# Patient Record
Sex: MALE | Employment: FULL TIME | URBAN - METROPOLITAN AREA
[De-identification: names, ages, dates, MRNs, and addresses within clinical notes are randomized per-mention and may not be internally consistent; named-entity substitution may affect disease eponyms.]

---

## 2024-09-05 ENCOUNTER — OFFICE VISIT (OUTPATIENT)
Age: 44
End: 2024-09-05

## 2024-09-05 VITALS
OXYGEN SATURATION: 96 % | BODY MASS INDEX: 26.83 KG/M2 | WEIGHT: 177 LBS | RESPIRATION RATE: 18 BRPM | DIASTOLIC BLOOD PRESSURE: 79 MMHG | HEART RATE: 65 BPM | HEIGHT: 68 IN | SYSTOLIC BLOOD PRESSURE: 122 MMHG | TEMPERATURE: 98.7 F

## 2024-09-05 DIAGNOSIS — J40 BRONCHITIS: Primary | ICD-10-CM

## 2024-09-05 RX ORDER — DEXTROMETHORPHAN HYDROBROMIDE AND PROMETHAZINE HYDROCHLORIDE 15; 6.25 MG/5ML; MG/5ML
5 SYRUP ORAL 4 TIMES DAILY PRN
Qty: 120 ML | Refills: 0 | Status: SHIPPED | OUTPATIENT
Start: 2024-09-05

## 2024-09-05 RX ORDER — AZITHROMYCIN 250 MG/1
TABLET, FILM COATED ORAL
Qty: 6 TABLET | Refills: 0 | Status: SHIPPED | OUTPATIENT
Start: 2024-09-05 | End: 2024-09-15

## 2024-09-05 RX ORDER — PREDNISONE 20 MG/1
40 TABLET ORAL DAILY
Qty: 10 TABLET | Refills: 0 | Status: SHIPPED | OUTPATIENT
Start: 2024-09-05 | End: 2024-09-10

## 2024-09-05 ASSESSMENT — ENCOUNTER SYMPTOMS
CHEST TIGHTNESS: 1
COUGH: 1
SORE THROAT: 0
SINUS PRESSURE: 0
SINUS PAIN: 1
SHORTNESS OF BREATH: 1

## 2024-09-05 NOTE — PROGRESS NOTES
Ze Ruiz (:  1980) is a 44 y.o. male,Established patient, here for evaluation of the following chief complaint(s):  Cough      ASSESSMENT/PLAN:    ICD-10-CM    1. Bronchitis  J40 promethazine-dextromethorphan (PROMETHAZINE-DM) 6.25-15 MG/5ML syrup     predniSONE (DELTASONE) 20 MG tablet     azithromycin (ZITHROMAX) 250 MG tablet        Clinical exam consistent with bronchitis  Drink fluids  Rest  Azithromycin as directed  Prednisone as directed  Promethazine DM take as directed. Do not drive while taking it as it may cause drowsiness.     Patient verbalized understanding of printed and verbal discharge instructions including follow up care.        Follow up in 7 days if symptoms persist or if symptoms worsen.    SUBJECTIVE/OBJECTIVE:    Cough  This is a recurrent problem. The current episode started 1 to 4 weeks ago. The problem has been gradually worsening. The problem occurs every few hours. The cough is Productive of sputum. Associated symptoms include a fever, nasal congestion, shortness of breath and sweats. Pertinent negatives include no chills, ear pain or sore throat. Nothing aggravates the symptoms. He has tried OTC cough suppressant for the symptoms. The treatment provided no relief.           Vitals:    24 1738   BP: 122/79   Site: Right Upper Arm   Position: Sitting   Cuff Size: Medium Adult   Pulse: 65   Resp: 18   Temp: 98.7 °F (37.1 °C)   TempSrc: Oral   SpO2: 96%   Weight: 80.3 kg (177 lb)   Height: 1.727 m (5' 8\")       Review of Systems   Constitutional:  Positive for fatigue and fever. Negative for chills.   HENT:  Positive for sinus pain. Negative for ear discharge, ear pain, sinus pressure and sore throat.    Respiratory:  Positive for cough, chest tightness and shortness of breath.        Physical Exam  Vitals and nursing note reviewed.   Constitutional:       Appearance: Normal appearance.   HENT:      Right Ear: Tympanic membrane, ear canal and external ear normal.

## 2024-09-13 ENCOUNTER — OFFICE VISIT (OUTPATIENT)
Age: 44
End: 2024-09-13

## 2024-09-13 VITALS
RESPIRATION RATE: 16 BRPM | BODY MASS INDEX: 26.83 KG/M2 | HEIGHT: 68 IN | HEART RATE: 74 BPM | WEIGHT: 177 LBS | TEMPERATURE: 98 F | OXYGEN SATURATION: 96 % | DIASTOLIC BLOOD PRESSURE: 82 MMHG | SYSTOLIC BLOOD PRESSURE: 120 MMHG

## 2024-09-13 DIAGNOSIS — J40 BRONCHITIS: Primary | ICD-10-CM

## 2024-09-13 DIAGNOSIS — R05.1 ACUTE COUGH: ICD-10-CM

## 2024-09-13 RX ORDER — PREDNISONE 20 MG/1
40 TABLET ORAL DAILY
Qty: 5 TABLET | Refills: 0 | Status: SHIPPED | OUTPATIENT
Start: 2024-09-13 | End: 2024-09-18

## 2024-09-13 RX ORDER — BENZONATATE 200 MG/1
200 CAPSULE ORAL 3 TIMES DAILY PRN
Qty: 30 CAPSULE | Refills: 0 | Status: SHIPPED | OUTPATIENT
Start: 2024-09-13 | End: 2024-09-23

## 2024-09-13 ASSESSMENT — ENCOUNTER SYMPTOMS: COUGH: 1
